# Patient Record
Sex: MALE | Race: WHITE
[De-identification: names, ages, dates, MRNs, and addresses within clinical notes are randomized per-mention and may not be internally consistent; named-entity substitution may affect disease eponyms.]

---

## 2019-06-13 ENCOUNTER — HOSPITAL ENCOUNTER (EMERGENCY)
Dept: HOSPITAL 11 - JP.ED | Age: 34
LOS: 1 days | Discharge: HOME | End: 2019-06-14
Payer: COMMERCIAL
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
## 2019-06-13 DIAGNOSIS — S62.609A: ICD-10-CM

## 2019-06-13 DIAGNOSIS — Z79.899: ICD-10-CM

## 2019-06-13 DIAGNOSIS — R50.9: Primary | ICD-10-CM

## 2019-06-13 PROCEDURE — 83605 ASSAY OF LACTIC ACID: CPT

## 2019-06-13 PROCEDURE — 71045 X-RAY EXAM CHEST 1 VIEW: CPT

## 2019-06-13 PROCEDURE — 99283 EMERGENCY DEPT VISIT LOW MDM: CPT

## 2019-06-13 PROCEDURE — 87040 BLOOD CULTURE FOR BACTERIA: CPT

## 2019-06-13 PROCEDURE — 36415 COLL VENOUS BLD VENIPUNCTURE: CPT

## 2019-06-13 PROCEDURE — 96372 THER/PROPH/DIAG INJ SC/IM: CPT

## 2019-06-13 PROCEDURE — 85025 COMPLETE CBC W/AUTO DIFF WBC: CPT

## 2019-06-13 NOTE — EDM.PDOC
ED HPI GENERAL MEDICAL PROBLEM





- General


Chief Complaint: Fever


Stated Complaint: FEVER POSTOP LEFT 5TH FINGER


Time Seen by Provider: 06/13/19 23:48


Source of Information: Reports: Patient


History Limitations: Reports: No Limitations





- History of Present Illness


INITIAL COMMENTS - FREE TEXT/NARRATIVE: 





pt arrived chilling and having a fever of 102.6. He had been chilling all 

afternoon. He had surgery on thur for a finger fracture.  He did have general 

anesthesia. 


Onset: Today


Duration: Hour(s):


Location: Reports: Chest, Upper Extremity, Left


Associated Symptoms: Reports: Fever/Chills, Other (pt does not feel sob. )


  ** Left Hand


Pain Score (Numeric/FACES): 3





- Related Data


 Allergies











Allergy/AdvReac Type Severity Reaction Status Date / Time


 


No Known Allergies Allergy   Verified 06/13/19 22:24











Home Meds: 


 Home Meds





Acetaminophen [Tylenol Extra Strength] 2 tab PO Q6H PRN 06/13/19 [History]


Amoxicillin/Clavulanate K [Augmentin 875-125 MG] 1 tab PO BID 06/13/19 [History]


Ibuprofen 1 tab PO Q6H PRN 06/13/19 [History]











Past Medical History


Musculoskeletal History: Reports: Fracture





- Infectious Disease History


Infectious Disease History: Reports: Chicken Pox





- Past Surgical History


HEENT Surgical History: Reports: Oral Surgery





Social & Family History





- Family History


Family Medical History: Noncontributory





- Tobacco Use


Smoking Status *Q: Never Smoker


Second Hand Smoke Exposure: No





- Caffeine Use


Caffeine Use: Reports: Soda





- Recreational Drug Use


Recreational Drug Use: No





ED ROS GENERAL





- Review of Systems


Review Of Systems: See Below


Constitutional: Reports: Fever, Chills, Malaise


HEENT: Reports: No Symptoms


Respiratory: Reports: No Symptoms


Cardiovascular: Reports: No Symptoms


Endocrine: Reports: No Symptoms


GI/Abdominal: Reports: No Symptoms


: Reports: No Symptoms


Musculoskeletal: Reports: No Symptoms, Other (pt does not have increased pain 

in the 5th finger. )





ED EXAM, SEPSIS





- Physical Exam


Exam: See Below


Text/Narrative:: 





pt arrived with a fever spike. He had his  5th finger pinned today. He was 

iontubated at the time of surgery. 


Exam Limited By: No Limitations


General Appearance: Alert, Moderate Distress


Ears: Normal External Exam


Nose: Normal Inspection


Throat/Mouth: Normal Inspection


Head: Atraumatic


Neck: Normal Inspection


Respiratory/Chest: No Respiratory Distress, Other ( chest xray was clear)


Cardiovascular: Regular Rate, Rhythm


Extremities: Other ( splint was partially removed and the finger did not look 

red and hot. )


Neurological: Alert, Oriented, Normal Cognition





Course





- Vital Signs


Last Recorded V/S: 


 Last Vital Signs











Temp  38.4 C H  06/14/19 00:16


 


Pulse  134 H  06/13/19 22:27


 


Resp  18   06/13/19 22:27


 


BP  113/60   06/13/19 22:27


 


Pulse Ox  95   06/13/19 22:27














- Orders/Labs/Meds


Labs: 


 Laboratory Tests











  06/13/19 06/13/19 Range/Units





  23:10 23:15 


 


WBC  12.7 H   (4.5-11.0)  K/uL


 


RBC  4.51   (4.30-5.90)  M/uL


 


Hgb  13.5   (12.0-15.0)  g/dL


 


Hct  38.6 L   (40.0-54.0)  %


 


MCV  86   (80-98)  fL


 


MCH  30   (27-31)  pg


 


MCHC  35   (32-36)  %


 


Plt Count  175   (150-400)  K/uL


 


Neut % (Auto)  91 H   (36-66)  %


 


Lymph % (Auto)  4 L   (24-44)  %


 


Mono % (Auto)  5   (2-6)  %


 


Eos % (Auto)  0 L   (2-4)  %


 


Baso % (Auto)  0   (0-1)  %


 


Lactic Acid   1.3  (0.4-2.0)  mmol/L











Meds: 


Medications














Discontinued Medications














Generic Name Dose Route Start Last Admin





  Trade Name Shalonda  PRN Reason Stop Dose Admin


 


Acetaminophen  650 mg  06/13/19 23:07  06/13/19 23:23





  Tylenol  PO  06/13/19 23:08  650 mg





  NOW ONE   Administration





     





     





     





     


 


Ceftriaxone Sodium 1 gm/  0 gm  06/14/19 00:25  06/14/19 00:36





  Lidocaine HCl 2.1 ml  IM  06/14/19 00:26  1 inj





  ONETIME ONE   Administration





     





     





     





     


 


Ibuprofen  400 mg  06/14/19 00:09  06/14/19 00:16





  Motrin  PO  06/14/19 00:10  400 mg





  ONETIME ONE   Administration





     





     





     





     














- Re-Assessments/Exams


Free Text/Narrative Re-Assessment/Exam: 





06/14/19 00:05


pt does have a fever of 102.6.  He did have general anesthea this am and he 

thinks he was intubated. His splint was removed and the finger was checked and 

it did look good.  His chest did sound ok but I would wonder about a small 

asperation. He was given rocephen 1 gm im.   


06/14/19 00:29








Departure





- Departure


Time of Disposition: 00:50


Disposition: Home, Self-Care 01


Condition: Fair


Clinical Impression: 


 Fever, Fracture of finger of left hand








- Discharge Information


Instructions:  Finger Fracture, Adult, Easy-to-Read, Fever, Adult, Easy-to-Read


Referrals: 


PCP,None [Primary Care Provider] - 


Forms:  ED Department Discharge


Care Plan Goals: 


continue augmentin, pt was given rocephen 1 GM Im, pt should use tylenol and 

motrin for the temp,  If pt hs a persistent temp and is feeling ill over the 

next 2 days he needs to return to [place] and see his surgeon.

## 2019-06-14 NOTE — CRLCR
Indication:



Fever



Technique:



Chest 1 view



Comparison:



None



Findings:



Cardiovascular and mediastinum: Heart size and vasculature are normal in 

caliber and appearance.  Mediastinum is within normal limits.  



Lungs and pleural space: Scattered nodular opacities in the lungs 

bilaterally measuring up to 6 mm. No focal infiltrate. No sign of pleural 

effusion.  No pneumothorax.  



Bones and soft tissues: No significant findings.  



Impression: :



No focal infiltrate, effusion, or pneumothorax. 



Nodular opacities in both lungs may represent pulmonary nodules. Recommend 

nonemergent chest CT for further evaluation.



Dictated by Divya Gomes MD @ Jun 14 2019 12:21AM



Signed by Dr. Divya Gomes @ Jun 14 2019 12:23AM